# Patient Record
Sex: MALE | Race: WHITE | NOT HISPANIC OR LATINO | Employment: STUDENT | URBAN - METROPOLITAN AREA
[De-identification: names, ages, dates, MRNs, and addresses within clinical notes are randomized per-mention and may not be internally consistent; named-entity substitution may affect disease eponyms.]

---
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
## 2023-11-14 NOTE — LETTER
[facility]  Urgent Care  [address]  [place] [address]  Phone: [phone]  Fax: [phone] December 14, 2018    Patient: Saw Jacobson   Patient ID [id number]   YOB: 2005   Date of Visit: 12/14/2018       To Whom It May Concern:    Saw Jacobson was seen and treated in our URGENT CARE department on 12/14/2018. He may return to school on December 17th 2018.   Sincerely,       Ashley Mary NP       
oral